# Patient Record
Sex: FEMALE | Race: BLACK OR AFRICAN AMERICAN | Employment: PART TIME | URBAN - METROPOLITAN AREA
[De-identification: names, ages, dates, MRNs, and addresses within clinical notes are randomized per-mention and may not be internally consistent; named-entity substitution may affect disease eponyms.]

---
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
## 2020-07-09 PROBLEM — Z34.81 MULTIGRAVIDA IN FIRST TRIMESTER: Status: ACTIVE | Noted: 2020-07-09

## 2020-07-09 PROBLEM — Z87.59 HISTORY OF GESTATIONAL HYPERTENSION: Status: ACTIVE | Noted: 2020-07-09

## 2020-07-09 PROBLEM — Z34.82 MULTIGRAVIDA IN SECOND TRIMESTER: Status: ACTIVE | Noted: 2020-07-09

## 2020-07-20 PROBLEM — A74.9 CHLAMYDIA INFECTION AFFECTING PREGNANCY IN SECOND TRIMESTER: Status: ACTIVE | Noted: 2020-07-20

## 2020-07-20 PROBLEM — O98.812 CHLAMYDIA INFECTION AFFECTING PREGNANCY IN SECOND TRIMESTER: Status: ACTIVE | Noted: 2020-07-20

## 2020-07-20 PROBLEM — O98.212 GONORRHEA AFFECTING PREGNANCY IN SECOND TRIMESTER: Status: ACTIVE | Noted: 2020-07-20

## 2020-10-06 PROBLEM — O99.119 THROMBOCYTOPENIA AFFECTING PREGNANCY (HCC): Status: ACTIVE | Noted: 2020-10-06

## 2020-10-06 PROBLEM — D69.6 THROMBOCYTOPENIA AFFECTING PREGNANCY (HCC): Status: ACTIVE | Noted: 2020-10-06

## 2020-10-19 PROBLEM — Z34.83 MULTIGRAVIDA IN THIRD TRIMESTER: Status: ACTIVE | Noted: 2020-07-09

## 2020-11-02 PROBLEM — O26.893 VAGINAL DISCHARGE DURING PREGNANCY IN THIRD TRIMESTER: Status: ACTIVE | Noted: 2020-11-02

## 2020-11-02 PROBLEM — N89.8 VAGINAL DISCHARGE DURING PREGNANCY IN THIRD TRIMESTER: Status: ACTIVE | Noted: 2020-11-02

## 2020-11-21 ENCOUNTER — HOSPITAL ENCOUNTER (OUTPATIENT)
Age: 20
Discharge: HOME | End: 2020-11-21
Attending: OBSTETRICS & GYNECOLOGY | Admitting: OBSTETRICS & GYNECOLOGY
Payer: COMMERCIAL

## 2020-11-21 VITALS
SYSTOLIC BLOOD PRESSURE: 131 MMHG | TEMPERATURE: 98 F | BODY MASS INDEX: 33.75 KG/M2 | HEART RATE: 101 BPM | HEIGHT: 66 IN | DIASTOLIC BLOOD PRESSURE: 74 MMHG | WEIGHT: 210 LBS | RESPIRATION RATE: 18 BRPM

## 2020-11-21 PROBLEM — O46.93 VAGINAL BLEEDING IN PREGNANCY, THIRD TRIMESTER: Status: ACTIVE | Noted: 2020-11-21

## 2020-11-21 LAB
GLUCOSE, GLUUPC: NEGATIVE
KETONES UR-MCNC: NEGATIVE MG/DL
PROT UR QL: NEGATIVE

## 2020-11-21 PROCEDURE — 75810000275 HC EMERGENCY DEPT VISIT NO LEVEL OF CARE

## 2020-11-21 PROCEDURE — 76815 OB US LIMITED FETUS(S): CPT

## 2020-11-21 PROCEDURE — 59025 FETAL NON-STRESS TEST: CPT

## 2020-11-21 PROCEDURE — 81002 URINALYSIS NONAUTO W/O SCOPE: CPT | Performed by: OBSTETRICS & GYNECOLOGY

## 2020-11-21 PROCEDURE — 99285 EMERGENCY DEPT VISIT HI MDM: CPT

## 2020-11-21 NOTE — PROGRESS NOTES
Patient presents to triage from home with complaints of vaginal bleeding after intercourse. Denies any contractions reports decreased fetal movement.

## 2020-11-21 NOTE — H&P
Chief Complaint: VB      21 y.o. female  at 34w3d  weeks gestation who is seen for an episode of VB after having intercourse earlier this evening. Also, pt notes decreased FM over the last 1-2 hours. Pt denies LOF, uterine ctx, abdominal pain, UTI, PEC, or COVID-19 symptoms. HISTORY:    Social History     Substance and Sexual Activity   Sexual Activity Yes     Patient's last menstrual period was 2020 (approximate).     Social History     Socioeconomic History    Marital status: SINGLE     Spouse name: Not on file    Number of children: Not on file    Years of education: Not on file    Highest education level: Not on file   Occupational History    Not on file   Social Needs    Financial resource strain: Not on file    Food insecurity     Worry: Not on file     Inability: Not on file    Transportation needs     Medical: Not on file     Non-medical: Not on file   Tobacco Use    Smoking status: Never Smoker    Smokeless tobacco: Never Used   Substance and Sexual Activity    Alcohol use: No    Drug use: No    Sexual activity: Yes   Lifestyle    Physical activity     Days per week: Not on file     Minutes per session: Not on file    Stress: Not on file   Relationships    Social connections     Talks on phone: Not on file     Gets together: Not on file     Attends Ca[employer] service: Not on file     Active member of club or organization: Not on file     Attends meetings of clubs or organizations: Not on file     Relationship status: Not on file    Intimate partner violence     Fear of current or ex partner: Not on file     Emotionally abused: Not on file     Physically abused: Not on file     Forced sexual activity: Not on file   Other Topics Concern     Service Not Asked    Blood Transfusions Not Asked    Caffeine Concern No    Occupational Exposure Not Asked   Mary [place]ym[place] Hazards Not Asked    Sleep Concern Not Asked    Stress Concern Not Asked    Weight Concern Not Asked    Special Diet Not Asked    Back Care Not Asked    Exercise No    Bike Helmet Not Asked    Seat Belt Yes    Self-Exams No   Social History Narrative    Abuse: Feels safe at home, no history of physical abuse, no history of sexual abuse       No past surgical history on file. Past Medical History:   Diagnosis Date    Allergic rhinitis 2016    Chlamydia     Gonorrhea 2020    Obesity 11/29/2017         ROS:  An 8 point review of symptoms negative except for chief complaint as described above. PHYSICAL EXAM:  Blood pressure 131/74, pulse (!) 101, temperature 98 °F (36.7 °C), resp. rate 18, height 5' 6\" (1.676 m), weight 95.3 kg (210 lb), last menstrual period 04/12/2020. Constitutional: The patient appears well, alert, oriented x 3. Cardiovascular: Heart RRR, no murmurs. Respiratory: Lungs clear, no respiratory distress  GI: Abdomen soft, nontender, no guarding  No fundal tenderness  Musculoskeletal: no cva tenderness  Lower ext: no edema, neg benjamin's, reflexes +2  Skin: no rashes or lesions  Psychiatric:Mood/ Affect: appropriate  Genitourinary: SVE: long and closed; no active bleeding in the vagina  FHT: Category 1 with mod variability  TOCO:no ctx  Abd ultrasound: vtx; KYRA - 11cm; placenta appears normal without abruption    I personally reviewed pt's medical record including relevant labs and ultrasounds  I reviewed the NST at today's encounter    Assessment/Plan:  Pt presents with an episode of VB after intercourse earlier this evening. The VB has resolved. Will monitor the FHR tracing until it is reactive. Once the NST is reactive, will discharge the pt to home with PTL and VB precautions. Pt will then f/u with her PObP.

## 2020-11-21 NOTE — DISCHARGE INSTRUCTIONS
Patient Education   Patient Education        Early Stage of Labor at Home: Care Instructions  Your Care Instructions     If you came to the hospital while in early labor, your doctor may have asked if you want to labor at home until your contractions are stronger. Many women stay at home during early labor. This is often the longest part of the birthing process. It may last up to 2 to 3 days. Contractions are mild to moderate and shorter (about 30 to 45 seconds). You can usually keep talking during them. Contractions may also be irregular, about 5 to 20 minutes apart. They may even stop for a while. It helps to stay as relaxed as you can during this time. You can spend some or all of your early labor at home or anywhere else you may be comfortable. If you live far from the hospital or birthing center, you may want to think about going somewhere nearby so you can get back to the hospital quickly. For some women, there may be benefits to staying home during early labor, such as avoiding medicines or procedures. As labor progresses, you'll shift from early labor to active labor. During this time, contractions get more intense. They occur more often, about every 2 to 3 minutes. They also last longer, about 50 to 70 seconds. You will feel them even when you change positions and walk or move around. It may be hard to tell if you are in active labor. If you aren't sure, call your doctor or midwife. As your labor progresses, check in with your doctor or midwife about when to come back to the hospital or birthing center. You may have special instructions if your water broke or you tested positive for group B strep. Follow-up care is a key part of your treatment and safety. Be sure to make and go to all appointments, and call your doctor if you are having problems. It's also a good idea to know your test results and keep a list of the medicines you take. How can you care for yourself at home? · Get support.  Having a support person with you from early labor until after childbirth can have a positive effect on childbirth. · Find distractions. During early labor, you can walk, play cards, watch TV, or listen to music to help take your mind off your contractions. · Ask your partner, labor , or  for a massage. Shoulder and low back massage during contractions may ease your pain. Strong massage of the back muscles (counterpressure) during contractions may help relieve the pain of back labor. Tell your labor  exactly where to push and how hard to push. · Use imagery. This means using your imagination to decrease your pain. For instance, to help manage pain, picture your contractions as waves rolling over you. Picture a peaceful place, such as a beach or mountain stream, to help you relax between contractions. · Change positions during labor. Walking, kneeling, or sitting on a big rubber ball (birth ball) are good options. · Use focused breathing techniques. Breathing in a rhythm can distract you from pain. · Take a warm shower or bath. Warm water may ease pain and stress. When should you call for help? Call 911 anytime you think you may need emergency care. For example, call if:    · You passed out (lost consciousness).     · You have a seizure.     · You have severe vaginal bleeding.     · You have severe pain in your belly or pelvis.     · You have had fluid gushing or leaking from your vagina and you know or think the umbilical cord is bulging into your vagina. If this happens, immediately get down on your knees so your rear end (buttocks) is higher than your head. This will decrease the pressure on the cord until help arrives. Call your doctor now or seek immediate medical care if:    · You have new or worse signs of preeclampsia, such as:  ? Sudden swelling of your face, hands, or feet. ? New vision problems (such as dimness, blurring, or seeing spots).   ? A severe headache.     · You have any vaginal bleeding.     · You have belly pain or cramping.     · You have a fever.     · You have had regular contractions (with or without pain) for an hour. This means that you have 8 or more within 1 hour or 4 or more in 20 minutes after you change your position and drink fluids.     · You have a sudden release of fluid from your vagina.     · You have low back pain or pelvic pressure that does not go away.     · You notice that your baby has stopped moving or is moving much less than normal.   Watch closely for changes in your health, and be sure to contact your doctor if you have any problems. Where can you learn more? Go to http://www.gray.com/  Enter [id number] in the search box to learn more about \"Early Stage of Labor at Home: Care Instructions. \"  Current as of: February 11, 2020               Content Version: 12.6  © 1834-8998 [employer]. Care instructions adapted under license by [employer] (which disclaims liability or warranty for this information). If you have questions about a medical condition or this instruction, always ask your healthcare professional. [address] any warranty or liability for your use of this information. Counting Your Baby's Kicks: Care Instructions  Your Care Instructions     Counting your baby's kicks is one way your doctor can tell that your baby is healthy. Most women--especially in a first pregnancy--feel their baby move for the first time between 16 and 22 weeks. The movement may feel like flutters rather than kicks. Your baby may move more at certain times of the day. When you are active, you may notice less kicking than when you are resting. At your prenatal visits, your doctor will ask whether the baby is active. In your last trimester, your doctor may ask you to count the number of times you feel your baby move. Follow-up care is a key part of your treatment and safety.  Be sure to make and go to all appointments, and call your doctor if you are having problems. It's also a good idea to know your test results and keep a list of the medicines you take. How do you count fetal kicks? · A common method of checking your baby's movement is to count the number of kicks or moves you feel in 1 hour. Ten movements (such as kicks, flutters, or rolls) in 1 hour are normal. Some doctors suggest that you count in the morning until you get to 10 movements. Then you can quit for that day and start again the next day. · Pick your baby's most active time of day to count. This may be any time from morning to evening. · If you do not feel 10 movements in an hour, your baby may be sleeping. Wait for the next hour and count again. When should you call for help? Call your doctor now or seek immediate medical care if:    · You noticed that your baby has stopped moving or is moving much less than normal.   Watch closely for changes in your health, and be sure to contact your doctor if you have any problems. Where can you learn more? Go to http://www.gray.com/  Enter [id number] in the search box to learn more about \"Counting Your Baby's Kicks: Care Instructions. \"  Current as of: February 11, 2020               Content Version: 12.6  © 2923-1654 [employer]. Care instructions adapted under license by [employer] (which disclaims liability or warranty for this information). If you have questions about a medical condition or this instruction, always ask your healthcare professional. [address] any warranty or liability for your use of this information.

## 2020-11-21 NOTE — PROGRESS NOTES
Patient discharged to home in stable condition. Instructions given on kick counts and early labor, patient and significant other verbalize understanding.

## 2020-11-21 NOTE — PROGRESS NOTES
Dr Mackenzie De Paz  in room for ultrasound and SVE  0/0 small amount of dark brown glove noted on glove.

## 2020-11-24 PROBLEM — O26.893 VAGINAL DISCHARGE DURING PREGNANCY IN THIRD TRIMESTER: Status: RESOLVED | Noted: 2020-11-02 | Resolved: 2020-11-24

## 2020-11-24 PROBLEM — N89.8 VAGINAL DISCHARGE DURING PREGNANCY IN THIRD TRIMESTER: Status: RESOLVED | Noted: 2020-11-02 | Resolved: 2020-11-24

## 2020-11-24 PROBLEM — O46.93 VAGINAL BLEEDING IN PREGNANCY, THIRD TRIMESTER: Status: RESOLVED | Noted: 2020-11-21 | Resolved: 2020-11-24

## 2020-12-11 PROBLEM — O99.820 GBS (GROUP B STREPTOCOCCUS CARRIER), +RV CULTURE, CURRENTLY PREGNANT: Status: ACTIVE | Noted: 2020-12-11

## 2020-12-15 PROBLEM — O98.813 CHLAMYDIA INFECTION AFFECTING PREGNANCY IN THIRD TRIMESTER: Status: ACTIVE | Noted: 2020-07-20

## 2020-12-15 PROBLEM — O98.213 GONORRHEA AFFECTING PREGNANCY IN THIRD TRIMESTER: Status: ACTIVE | Noted: 2020-07-20

## 2020-12-26 ENCOUNTER — HOSPITAL ENCOUNTER (INPATIENT)
Age: 20
LOS: 2 days | Discharge: HOME | DRG: 560 | End: 2020-12-28
Attending: OBSTETRICS & GYNECOLOGY | Admitting: OBSTETRICS & GYNECOLOGY
Payer: COMMERCIAL

## 2020-12-26 ENCOUNTER — ANESTHESIA (OUTPATIENT)
Dept: LABOR AND DELIVERY | Age: 20
DRG: 560 | End: 2020-12-26
Payer: COMMERCIAL

## 2020-12-26 ENCOUNTER — ANESTHESIA EVENT (OUTPATIENT)
Dept: LABOR AND DELIVERY | Age: 20
DRG: 560 | End: 2020-12-26
Payer: COMMERCIAL

## 2020-12-26 DIAGNOSIS — G89.18 POSTOPERATIVE PAIN: Primary | ICD-10-CM

## 2020-12-26 PROBLEM — Z34.90 ENCOUNTER FOR INDUCTION OF LABOR: Status: ACTIVE | Noted: 2020-12-26

## 2020-12-26 PROBLEM — Z3A.39 39 WEEKS GESTATION OF PREGNANCY: Status: ACTIVE | Noted: 2020-12-26

## 2020-12-26 PROBLEM — B95.1 GROUP BETA STREP POSITIVE: Status: ACTIVE | Noted: 2020-12-26

## 2020-12-26 LAB
ABO + RH BLD: NORMAL
ARTERIAL PATENCY WRIST A: ABNORMAL
ARTERIAL PATENCY WRIST A: ABNORMAL
BASE DEFICIT BLD-SCNC: 5 MMOL/L
BASE DEFICIT BLDV-SCNC: 4 MMOL/L
BDY SITE: ABNORMAL
BDY SITE: ABNORMAL
BLOOD GROUP ANTIBODIES SERPL: NORMAL
CO2 BLD-SCNC: 23 MMOL/L
CO2 BLD-SCNC: 25 MMOL/L
COLLECT TIME,HTIME: 1436
COLLECT TIME,HTIME: 1436
ERYTHROCYTE [DISTWIDTH] IN BLOOD BY AUTOMATED COUNT: 13.5 % (ref 11.9–14.6)
GAS FLOW.O2 O2 DELIVERY SYS: ABNORMAL L/MIN
GAS FLOW.O2 O2 DELIVERY SYS: ABNORMAL L/MIN
HCO3 BLD-SCNC: 23.4 MMOL/L (ref 22–26)
HCO3 BLDV-SCNC: 21.9 MMOL/L (ref 23–28)
HCT VFR BLD AUTO: 35.5 % (ref 35.8–46.3)
HGB BLD-MCNC: 11.3 G/DL (ref 11.7–15.4)
MCH RBC QN AUTO: 27.2 PG (ref 26.1–32.9)
MCHC RBC AUTO-ENTMCNC: 31.8 G/DL (ref 31.4–35)
MCV RBC AUTO: 85.5 FL (ref 79.6–97.8)
NRBC # BLD: 0 K/UL (ref 0–0.2)
PCO2 BLD: 54.2 MMHG (ref 35–45)
PCO2 BLDV: 41.8 MMHG (ref 41–51)
PH BLD: 7.24 [PH] (ref 7.35–7.45)
PH BLDV: 7.33 [PH] (ref 7.32–7.42)
PLATELET # BLD AUTO: 146 K/UL (ref 150–450)
PMV BLD AUTO: 13.3 FL (ref 9.4–12.3)
PO2 BLD: 15 MMHG (ref 75–100)
PO2 BLDV: 25 MMHG
RBC # BLD AUTO: 4.15 M/UL (ref 4.05–5.2)
SAO2 % BLD: 14 % (ref 95–98)
SAO2 % BLDV: 42 % (ref 65–88)
SERVICE CMNT-IMP: ABNORMAL
SERVICE CMNT-IMP: ABNORMAL
SPECIMEN EXP DATE BLD: NORMAL
SPECIMEN TYPE: ABNORMAL
SPECIMEN TYPE: ABNORMAL
WBC # BLD AUTO: 10.5 K/UL (ref 4.3–11.1)

## 2020-12-26 PROCEDURE — 2709999900 HC NON-CHARGEABLE SUPPLY

## 2020-12-26 PROCEDURE — 85027 COMPLETE CBC AUTOMATED: CPT

## 2020-12-26 PROCEDURE — 74011000250 HC RX REV CODE- 250: Performed by: ANESTHESIOLOGY

## 2020-12-26 PROCEDURE — 65270000029 HC RM PRIVATE

## 2020-12-26 PROCEDURE — 74011250636 HC RX REV CODE- 250/636: Performed by: OBSTETRICS & GYNECOLOGY

## 2020-12-26 PROCEDURE — 75410000003 HC RECOV DEL/VAG/CSECN EA 0.5 HR: Performed by: OBSTETRICS & GYNECOLOGY

## 2020-12-26 PROCEDURE — 74011250637 HC RX REV CODE- 250/637: Performed by: OBSTETRICS & GYNECOLOGY

## 2020-12-26 PROCEDURE — 3E033VJ INTRODUCTION OF OTHER HORMONE INTO PERIPHERAL VEIN, PERCUTANEOUS APPROACH: ICD-10-PCS | Performed by: OBSTETRICS & GYNECOLOGY

## 2020-12-26 PROCEDURE — 3E0R3BZ INTRODUCTION OF ANESTHETIC AGENT INTO SPINAL CANAL, PERCUTANEOUS APPROACH: ICD-10-PCS | Performed by: ANESTHESIOLOGY

## 2020-12-26 PROCEDURE — 0UQGXZZ REPAIR VAGINA, EXTERNAL APPROACH: ICD-10-PCS | Performed by: OBSTETRICS & GYNECOLOGY

## 2020-12-26 PROCEDURE — 74011250636 HC RX REV CODE- 250/636: Performed by: REGISTERED NURSE

## 2020-12-26 PROCEDURE — 77030014125 HC TY EPDRL BBMI -B: Performed by: ANESTHESIOLOGY

## 2020-12-26 PROCEDURE — 82803 BLOOD GASES ANY COMBINATION: CPT

## 2020-12-26 PROCEDURE — 76060000078 HC EPIDURAL ANESTHESIA: Performed by: OBSTETRICS & GYNECOLOGY

## 2020-12-26 PROCEDURE — 0UQMXZZ REPAIR VULVA, EXTERNAL APPROACH: ICD-10-PCS | Performed by: OBSTETRICS & GYNECOLOGY

## 2020-12-26 PROCEDURE — 75410000000 HC DELIVERY VAGINAL/SINGLE: Performed by: OBSTETRICS & GYNECOLOGY

## 2020-12-26 PROCEDURE — 00HU33Z INSERTION OF INFUSION DEVICE INTO SPINAL CANAL, PERCUTANEOUS APPROACH: ICD-10-PCS | Performed by: ANESTHESIOLOGY

## 2020-12-26 PROCEDURE — 75410000002 HC LABOR FEE PER 1 HR: Performed by: OBSTETRICS & GYNECOLOGY

## 2020-12-26 PROCEDURE — A4300 CATH IMPL VASC ACCESS PORTAL: HCPCS | Performed by: ANESTHESIOLOGY

## 2020-12-26 PROCEDURE — 77030019905 HC CATH URETH INTMIT MDII -A

## 2020-12-26 PROCEDURE — 74011000258 HC RX REV CODE- 258: Performed by: OBSTETRICS & GYNECOLOGY

## 2020-12-26 PROCEDURE — 86900 BLOOD TYPING SEROLOGIC ABO: CPT

## 2020-12-26 RX ORDER — HYDROCODONE BITARTRATE AND ACETAMINOPHEN 10; 325 MG/1; MG/1
1 TABLET ORAL
Status: DISCONTINUED | OUTPATIENT
Start: 2020-12-26 | End: 2020-12-28 | Stop reason: HOSPADM

## 2020-12-26 RX ORDER — OXYTOCIN/RINGER'S LACTATE 30/500 ML
87.3 PLASTIC BAG, INJECTION (ML) INTRAVENOUS AS NEEDED
Status: COMPLETED | OUTPATIENT
Start: 2020-12-26 | End: 2020-12-26

## 2020-12-26 RX ORDER — ONDANSETRON 4 MG/1
4 TABLET, ORALLY DISINTEGRATING ORAL
Status: DISCONTINUED | OUTPATIENT
Start: 2020-12-26 | End: 2020-12-28 | Stop reason: HOSPADM

## 2020-12-26 RX ORDER — OXYTOCIN/RINGER'S LACTATE 30/500 ML
10 PLASTIC BAG, INJECTION (ML) INTRAVENOUS AS NEEDED
Status: DISCONTINUED | OUTPATIENT
Start: 2020-12-26 | End: 2020-12-28 | Stop reason: ALTCHOICE

## 2020-12-26 RX ORDER — HYDROCODONE BITARTRATE AND ACETAMINOPHEN 7.5; 325 MG/1; MG/1
1 TABLET ORAL
Status: DISCONTINUED | OUTPATIENT
Start: 2020-12-26 | End: 2020-12-28 | Stop reason: HOSPADM

## 2020-12-26 RX ORDER — ROPIVACAINE HYDROCHLORIDE 2 MG/ML
INJECTION, SOLUTION EPIDURAL; INFILTRATION; PERINEURAL AS NEEDED
Status: DISCONTINUED | OUTPATIENT
Start: 2020-12-26 | End: 2020-12-26 | Stop reason: HOSPADM

## 2020-12-26 RX ORDER — IBUPROFEN 800 MG/1
800 TABLET ORAL
Status: DISCONTINUED | OUTPATIENT
Start: 2020-12-26 | End: 2020-12-28 | Stop reason: HOSPADM

## 2020-12-26 RX ORDER — ROPIVACAINE HYDROCHLORIDE 2 MG/ML
INJECTION, SOLUTION EPIDURAL; INFILTRATION; PERINEURAL
Status: DISCONTINUED | OUTPATIENT
Start: 2020-12-26 | End: 2020-12-26 | Stop reason: HOSPADM

## 2020-12-26 RX ORDER — OXYTOCIN/RINGER'S LACTATE 30/500 ML
0-20 PLASTIC BAG, INJECTION (ML) INTRAVENOUS
Status: DISCONTINUED | OUTPATIENT
Start: 2020-12-26 | End: 2020-12-26

## 2020-12-26 RX ORDER — SIMETHICONE 80 MG
80 TABLET,CHEWABLE ORAL
Status: DISCONTINUED | OUTPATIENT
Start: 2020-12-26 | End: 2020-12-28 | Stop reason: HOSPADM

## 2020-12-26 RX ORDER — LOPERAMIDE HYDROCHLORIDE 2 MG/1
2 CAPSULE ORAL AS NEEDED
Status: DISCONTINUED | OUTPATIENT
Start: 2020-12-26 | End: 2020-12-28 | Stop reason: HOSPADM

## 2020-12-26 RX ORDER — BUTORPHANOL TARTRATE 2 MG/ML
1 INJECTION INTRAMUSCULAR; INTRAVENOUS
Status: DISCONTINUED | OUTPATIENT
Start: 2020-12-26 | End: 2020-12-26 | Stop reason: HOSPADM

## 2020-12-26 RX ORDER — OXYTOCIN/RINGER'S LACTATE 30/500 ML
10 PLASTIC BAG, INJECTION (ML) INTRAVENOUS AS NEEDED
Status: COMPLETED | OUTPATIENT
Start: 2020-12-26 | End: 2020-12-26

## 2020-12-26 RX ORDER — SODIUM CHLORIDE 0.9 % (FLUSH) 0.9 %
5-40 SYRINGE (ML) INJECTION EVERY 8 HOURS
Status: DISCONTINUED | OUTPATIENT
Start: 2020-12-26 | End: 2020-12-26

## 2020-12-26 RX ORDER — LIDOCAINE HYDROCHLORIDE AND EPINEPHRINE 15; 5 MG/ML; UG/ML
INJECTION, SOLUTION EPIDURAL
Status: COMPLETED | OUTPATIENT
Start: 2020-12-26 | End: 2020-12-26

## 2020-12-26 RX ORDER — DOCUSATE SODIUM 100 MG/1
100 CAPSULE, LIQUID FILLED ORAL 2 TIMES DAILY
Status: DISCONTINUED | OUTPATIENT
Start: 2020-12-26 | End: 2020-12-28 | Stop reason: HOSPADM

## 2020-12-26 RX ORDER — MINERAL OIL
120 OIL (ML) ORAL
Status: DISCONTINUED | OUTPATIENT
Start: 2020-12-26 | End: 2020-12-26 | Stop reason: HOSPADM

## 2020-12-26 RX ORDER — ZOLPIDEM TARTRATE 5 MG/1
5 TABLET ORAL
Status: DISCONTINUED | OUTPATIENT
Start: 2020-12-26 | End: 2020-12-28 | Stop reason: HOSPADM

## 2020-12-26 RX ORDER — DEXTROSE, SODIUM CHLORIDE, SODIUM LACTATE, POTASSIUM CHLORIDE, AND CALCIUM CHLORIDE 5; .6; .31; .03; .02 G/100ML; G/100ML; G/100ML; G/100ML; G/100ML
125 INJECTION, SOLUTION INTRAVENOUS CONTINUOUS
Status: DISCONTINUED | OUTPATIENT
Start: 2020-12-26 | End: 2020-12-26

## 2020-12-26 RX ORDER — SODIUM CHLORIDE 0.9 % (FLUSH) 0.9 %
5-40 SYRINGE (ML) INJECTION AS NEEDED
Status: DISCONTINUED | OUTPATIENT
Start: 2020-12-26 | End: 2020-12-26

## 2020-12-26 RX ORDER — DIPHENHYDRAMINE HCL 25 MG
25 CAPSULE ORAL
Status: DISCONTINUED | OUTPATIENT
Start: 2020-12-26 | End: 2020-12-28 | Stop reason: HOSPADM

## 2020-12-26 RX ORDER — OXYTOCIN/RINGER'S LACTATE 30/500 ML
87.3 PLASTIC BAG, INJECTION (ML) INTRAVENOUS AS NEEDED
Status: DISCONTINUED | OUTPATIENT
Start: 2020-12-26 | End: 2020-12-28 | Stop reason: ALTCHOICE

## 2020-12-26 RX ORDER — LIDOCAINE HYDROCHLORIDE 10 MG/ML
1 INJECTION INFILTRATION; PERINEURAL
Status: DISCONTINUED | OUTPATIENT
Start: 2020-12-26 | End: 2020-12-26 | Stop reason: HOSPADM

## 2020-12-26 RX ORDER — LIDOCAINE HYDROCHLORIDE 20 MG/ML
JELLY TOPICAL
Status: DISCONTINUED | OUTPATIENT
Start: 2020-12-26 | End: 2020-12-26 | Stop reason: HOSPADM

## 2020-12-26 RX ADMIN — WITCH HAZEL 1 PAD: 500 SOLUTION RECTAL; TOPICAL at 18:11

## 2020-12-26 RX ADMIN — Medication 6 ML: at 10:22

## 2020-12-26 RX ADMIN — IBUPROFEN 800 MG: 800 TABLET ORAL at 23:05

## 2020-12-26 RX ADMIN — Medication 4 ML: at 10:26

## 2020-12-26 RX ADMIN — OXYTOCIN 6 MILLI-UNITS/MIN: 10 INJECTION INTRAVENOUS at 07:38

## 2020-12-26 RX ADMIN — SODIUM CHLORIDE 2.5 MILLION UNITS: 9 INJECTION, SOLUTION INTRAVENOUS at 11:10

## 2020-12-26 RX ADMIN — HYDROCODONE BITARTRATE AND ACETAMINOPHEN 1 TABLET: 10; 325 TABLET ORAL at 18:32

## 2020-12-26 RX ADMIN — OXYTOCIN 2 MILLI-UNITS/MIN: 10 INJECTION INTRAVENOUS at 06:29

## 2020-12-26 RX ADMIN — SODIUM CHLORIDE 5 MILLION UNITS: 900 INJECTION INTRAVENOUS at 05:53

## 2020-12-26 RX ADMIN — LIDOCAINE HYDROCHLORIDE,EPINEPHRINE BITARTRATE 3 ML: 15; .005 INJECTION, SOLUTION EPIDURAL; INFILTRATION; INTRACAUDAL; PERINEURAL at 10:07

## 2020-12-26 RX ADMIN — ROPIVACAINE HYDROCHLORIDE 10 ML/HR: 2 INJECTION, SOLUTION EPIDURAL; INFILTRATION at 10:26

## 2020-12-26 RX ADMIN — SODIUM CHLORIDE, SODIUM LACTATE, POTASSIUM CHLORIDE, CALCIUM CHLORIDE, AND DEXTROSE MONOHYDRATE 125 ML/HR: 600; 310; 30; 20; 5 INJECTION, SOLUTION INTRAVENOUS at 05:46

## 2020-12-26 RX ADMIN — HYDROCODONE BITARTRATE AND ACETAMINOPHEN 1 TABLET: 10; 325 TABLET ORAL at 23:05

## 2020-12-26 RX ADMIN — SODIUM CHLORIDE, SODIUM LACTATE, POTASSIUM CHLORIDE, AND CALCIUM CHLORIDE 1000 ML: 600; 310; 30; 20 INJECTION, SOLUTION INTRAVENOUS at 10:05

## 2020-12-26 RX ADMIN — OXYTOCIN 10000 MILLI-UNITS: 10 INJECTION INTRAVENOUS at 14:41

## 2020-12-26 RX ADMIN — SODIUM CHLORIDE 2.5 MILLION UNITS: 9 INJECTION, SOLUTION INTRAVENOUS at 14:18

## 2020-12-26 RX ADMIN — OXYTOCIN 87.3 MILLI-UNITS/MIN: 10 INJECTION INTRAVENOUS at 15:06

## 2020-12-26 RX ADMIN — IBUPROFEN 800 MG: 800 TABLET ORAL at 17:09

## 2020-12-26 NOTE — PROGRESS NOTES
Sriram Lawrence at bedside at [phone]. JADE Carreon at bedside at 1008    Assisted pt to sitting up on bedside at 0956. Timeout completed at 1 with MD, JADE and myself at bedside. Test dose given at 1021. Negative reaction. Dose given at 1023 . Pt assisted to lying back in left tilt position. EFM difficult to monitor. RN remained at bedside for duration of procedure    See anesthesia record for details. See vital sign flow sheet for BP. Tolerated procedure well.

## 2020-12-26 NOTE — ANESTHESIA PREPROCEDURE EVALUATION
Relevant Problems   No relevant active problems       Anesthetic History          Comments: No GA prior, epidural for labor x1     Review of Systems / Medical History  Patient summary reviewed and pertinent labs reviewed    Pulmonary  Within defined limits                 Neuro/Psych   Within defined limits           Cardiovascular                  Exercise tolerance: >4 METS     GI/Hepatic/Renal                Endo/Other             Other Findings   Comments:  Thrombocytopenia history           Physical Exam    Airway  Mallampati: I  TM Distance: 4 - 6 cm  Neck ROM: normal range of motion   Mouth opening: Normal     Cardiovascular    Rhythm: regular  Rate: normal         Dental  No notable dental hx       Pulmonary  Breath sounds clear to auscultation               Abdominal         Other Findings            Anesthetic Plan    ASA: 2  Anesthesia type: epidural            Anesthetic plan and risks discussed with: Patient and Spouse

## 2020-12-26 NOTE — PROGRESS NOTES
SBAR OUT Report: Mother    Verbal report given to Dia Tiwari RN (full name & credentials) on this patient, who is now being transferred to MIU (unit) for routine progression of care. The patient is not wearing a green \"Anesthesia-Duramorph\" band. Report consisted of patient's Situation, Background, Assessment and Recommendations (SBAR).  ID bands were compared with the identification form, and verified with the patient and receiving nurse. Information from the SBAR and the [employer] Report was reviewed with the receiving nurse; opportunity for questions and clarification provided.

## 2020-12-26 NOTE — ROUTINE PROCESS
SBAR IN Report: Mother Verbal report received from Shelly Banerjee RN (full name & credentials) on this patient, who is now being transferred from L & D (unit) for routine progression of care. The patient is not wearing a green \"Anesthesia-Duramorph\" band. Report consisted of patient's Situation, Background, Assessment and Recommendations (SBAR). [place] ID bands were compared with the identification form, and verified with the patient and transferring nurse. Information from the SBAR and the [place] Report was reviewed with the transferring nurse; opportunity for questions and clarification provided.

## 2020-12-26 NOTE — H&P
History & Physical    Name: Kwame Mcmillan MRN: [id number]  SSN: [id number]    YOB: 2000  Age: 21 y.o. Sex: female      Subjective:     Estimated Date of Delivery: 20  OB History    Para Term  AB Living   2 1 1     1   SAB TAB Ectopic Molar Multiple Live Births             1      # Outcome Date GA Lbr Randy/2nd Weight Sex Delivery Anes PTL Lv   2 Current            1 Term 19 37w1d / 00:41 7 lb 11.3 oz (3.495 kg) Donel Deter       Ms. Marcia Peñaloza is admitted with pregnancy at 39w3d for induction of labor due to favorable cervix at term. Prenatal course was normal.  Please see prenatal records for details. Past Medical History:   Diagnosis Date    Allergic rhinitis     Chlamydia     Gonorrhea     Obesity 2017     No past surgical history on file. Social History     Occupational History    Not on file   Tobacco Use    Smoking status: Never Smoker    Smokeless tobacco: Never Used   Substance and Sexual Activity    Alcohol use: No    Drug use: No    Sexual activity: Yes     Family History   Problem Relation Age of Onset    Obesity Mother     Allergic Rhinitis Father     Allergic Rhinitis Sister     Allergic Rhinitis Brother     Allergic Rhinitis Brother     Breast Cancer Neg Hx     Ovarian Cancer Neg Hx     Uterine Cancer Neg Hx     Colon Cancer Neg Hx        No Known Allergies  Prior to Admission medications    Medication Sig Start Date End Date Taking? Authorizing Provider   prenatal vit-iron fumarate-fa 27 mg iron- 0.8 mg tab tablet Take 1 Tab by mouth daily. 20  Yes Gaviota Kraus NP   terconazole (TERAZOL 7) 0.4 % vaginal cream Insert 1 Applicator into vagina nightly. 12/15/20   Gaviota Kraus NP        Review of Systems: A comprehensive review of systems was negative except for that written in the History of Present Illness.     Objective:     Vitals:  Vitals:    20 1025 20 1026 20 1028 20 1030   BP: 123/68 120/62 120/74 118/66   Pulse:   85 75   Resp:       Temp:            Physical Exam:  Patient without distress. Heart: Regular rate and rhythm  Lung: clear to auscultation throughout lung fields, no wheezes, no rales, no rhonchi and normal respiratory effort  Abdomen: soft, nontender  Cervical Exam: 3/60 %/-3/   Membranes:  Artificial Rupture of Membranes; Amniotic Fluid: small amount of clear fluid  Fetal Heart Rate: Reactive          Prenatal Labs:   Lab Results   Component Value Date/Time    ABO/Rh(D) A POSITIVE 12/26/2020 05:44 AM       Impression/Plan:     Principal Problem:    Encounter for induction of labor (12/26/2020)    Active Problems:    Multigravida in third trimester (7/9/2020)      Overview: EDC by 15 1/7 week US - unsure LMP            8/13/2020: Declines genetic screening            10/5/2020: Plans bottlefeeding, TDAP and Flu recommended            10/19/2020: EFW 52%, AC 20%, KYRA nl, vertex      GBS (group B Streptococcus carrier), +RV culture, currently pregnant (12/11/2020)      Overview: Treat in labor      39 weeks gestation of pregnancy (12/26/2020)      Group beta Strep positive (12/26/2020)         Plan: Admit for induction of labor. Group B Strep positive, will treat prophylactically with penicillin.

## 2020-12-26 NOTE — PROGRESS NOTES
0700: Bedside and Verbal shift change report given to JESSENIA Montalvo RN (oncoming nurse) by GILLIAN Anguiano RN (offgoing nurse). Report included the following information SBAR.       0702: Shift assessment complete- see flowsheets    0354: MD notified of SVE results and updated of EFM.  Will decrease pitocin per MD for late deceles

## 2020-12-26 NOTE — PROGRESS NOTES
Admission assessment complete as noted. Patient oriented to room and unit. Plan of care reviewed and patient verbalizes understanding. Questions encouraged and answered. Patent encouraged to call for needs or concerns. Safety Teaching reviewed:   1. Hand hygiene prior to handling the infant. 2. Use of bulb syringe. 3. Bracelets with matching numbers are placed on mother and infant  4. An infant security tag  [facility]) is placed on the infant's ankle and monitored  5. All OB nurses wear pink Employee badges - do not give your baby to anyone without proper identification. 6. Never leave the baby alone in the room. 7. The infant should be placed on their back to sleep. on a firm mattress. No toys should be placed in the crib. (safe sleep video offered to view)  8. Never shake the baby (video offered to view)  9. Infant fall prevention - do not sleep with the baby, and place the baby in the crib while ambulating. 8. Mother and Baby Care booklet given to Mother.

## 2020-12-26 NOTE — PROGRESS NOTES
Pt presented for scheduled induction. Pt states she had a phone interview with [address] [place] [address] RN and the information that was obtained has not changed. POC reviewed. IV started, Consents witnessed. Lab work drawn, sent to lab.

## 2020-12-26 NOTE — ANESTHESIA PROCEDURE NOTES
Epidural Block    Patient location during procedure: OB  Start time: 12/26/2020 10:07 AM  End time: 12/26/2020 10:20 AM  Reason for block: labor epidural  Staffing  Performed: attending   Anesthesiologist: Polo Gibbs MD  Preanesthetic Checklist  Completed: patient identified, IV checked, site marked, risks and benefits discussed, surgical consent, monitors and equipment checked, pre-op evaluation and timeout performed  Block Placement  Patient position: sitting  Prep: ChloraPrep  Sterility prep: cap, drape, mask, hand and gloves  Sedation level: no sedation  Patient monitoring: heart rate  Approach: right paramedian  Location: lumbar  Lumbar location: L1-L2 (attempted L2-3, placed L1-2)  Epidural  Loss of resistance technique: air  Guidance: landmark technique  Needle  Needle type: Tuohy   Needle gauge: 17 G  Needle length: 9 cm  Needle insertion depth: 5 cm  Catheter size: 19 G  Catheter at skin depth: 11 cm  Catheter securement method: clear occlusive dressing, liquid medical adhesive and stabilization device  Test dose: negative  Medications Administered  Lidocaine-EPINEPHrine (XYLOCAINE) 1.5 %-1:200,000 Epidural, 3 mL  Assessment  Number of attempts: 2  Procedure assessment: patient tolerated procedure well with no complications  Additional Notes  Timeout 1005, poor patient positioning despite multiple attempts to have her slouch made my first attempt impossible to get in between the vertebra, one interspace up went in with a single pass/no needle redirections

## 2020-12-27 PROCEDURE — 74011250637 HC RX REV CODE- 250/637: Performed by: OBSTETRICS & GYNECOLOGY

## 2020-12-27 PROCEDURE — 2709999900 HC NON-CHARGEABLE SUPPLY

## 2020-12-27 PROCEDURE — 65270000029 HC RM PRIVATE

## 2020-12-27 RX ADMIN — HYDROCODONE BITARTRATE AND ACETAMINOPHEN 1 TABLET: 10; 325 TABLET ORAL at 03:03

## 2020-12-27 RX ADMIN — DOCUSATE SODIUM 100 MG: 100 CAPSULE ORAL at 08:33

## 2020-12-27 RX ADMIN — HYDROCODONE BITARTRATE AND ACETAMINOPHEN 1 TABLET: 7.5; 325 TABLET ORAL at 14:16

## 2020-12-27 RX ADMIN — HYDROCODONE BITARTRATE AND ACETAMINOPHEN 1 TABLET: 10; 325 TABLET ORAL at 20:06

## 2020-12-27 RX ADMIN — DOCUSATE SODIUM 100 MG: 100 CAPSULE ORAL at 18:16

## 2020-12-27 RX ADMIN — IBUPROFEN 800 MG: 800 TABLET ORAL at 05:03

## 2020-12-27 RX ADMIN — IBUPROFEN 800 MG: 800 TABLET ORAL at 14:16

## 2020-12-27 RX ADMIN — HYDROCODONE BITARTRATE AND ACETAMINOPHEN 1 TABLET: 10; 325 TABLET ORAL at 06:44

## 2020-12-27 RX ADMIN — IBUPROFEN 800 MG: 800 TABLET ORAL at 20:06

## 2020-12-27 NOTE — PROGRESS NOTES
Progress Note                               Patient: Carlo Mathew MRN: [id number]  SSN: [id number]    YOB: 2000  Age: 21 y.o. Sex: female      Postpartum Day Number 1    Subjective:     Patient doing well postpartum without significant complaints. Voiding without difficulty. Patient reports normal lochia. . Breastfeeding: No     Objective:     Patient Vitals for the past 18 hrs:   Temp Pulse Resp BP SpO2   20 0708 97.9 °F (36.6 °C) 60 18 (!) 103/57 100 %   20 1925 98.2 °F (36.8 °C) 74 18 116/62 98 %   20 1800 99 °F (37.2 °C) 85 18 123/70 97 %        Temp (24hrs), Av.3 °F (36.8 °C), Min:97.9 °F (36.6 °C), Max:99 °F (37.2 °C)      Physical Exam:    Patient without distress. Lab/Data Review: All lab results for the last 24 hours reviewed. GBS: No results found for: GRBSEXT, GRBSEXT  Blood Type:   Lab Results   Component Value Date/Time    ABO/Rh(D) A POSITIVE 2020 05:44 AM        Assessment and Plan:     Patient appears to be having an uncomplicated postpartum course. Continue routine perineal care and maternal education.

## 2020-12-28 VITALS
SYSTOLIC BLOOD PRESSURE: 108 MMHG | HEART RATE: 75 BPM | RESPIRATION RATE: 17 BRPM | DIASTOLIC BLOOD PRESSURE: 51 MMHG | TEMPERATURE: 97.9 F | OXYGEN SATURATION: 99 %

## 2020-12-28 PROCEDURE — 2709999900 HC NON-CHARGEABLE SUPPLY

## 2020-12-28 PROCEDURE — 74011250637 HC RX REV CODE- 250/637: Performed by: OBSTETRICS & GYNECOLOGY

## 2020-12-28 RX ORDER — HYDROCODONE BITARTRATE AND ACETAMINOPHEN 7.5; 325 MG/1; MG/1
1 TABLET ORAL
Qty: 15 TAB | Refills: 0 | Status: SHIPPED | OUTPATIENT
Start: 2020-12-28 | End: 2021-01-04

## 2020-12-28 RX ORDER — IBUPROFEN 800 MG/1
800 TABLET ORAL
Qty: 100 TAB | Refills: 2 | Status: SHIPPED | OUTPATIENT
Start: 2020-12-28 | End: 2021-02-15

## 2020-12-28 RX ADMIN — HYDROCODONE BITARTRATE AND ACETAMINOPHEN 1 TABLET: 7.5; 325 TABLET ORAL at 10:38

## 2020-12-28 RX ADMIN — IBUPROFEN 800 MG: 800 TABLET ORAL at 10:38

## 2020-12-28 RX ADMIN — DOCUSATE SODIUM 100 MG: 100 CAPSULE ORAL at 10:38

## 2020-12-28 RX ADMIN — IBUPROFEN 800 MG: 800 TABLET ORAL at 03:41

## 2020-12-28 RX ADMIN — HYDROCODONE BITARTRATE AND ACETAMINOPHEN 1 TABLET: 10; 325 TABLET ORAL at 03:42

## 2020-12-28 NOTE — DISCHARGE INSTRUCTIONS

## 2020-12-28 NOTE — PROGRESS NOTES
Discharge instructions completed. Patient voiced understanding. Prescriptions sent electronically. Patient discharged home. Ambulating. Wheelchair refused.

## 2020-12-28 NOTE — PROGRESS NOTES
Progress Note                               Patient: Emelia Gloria MRN: [id number]  SSN: [id number]    YOB: 2000  Age: 21 y.o. Sex: female      Postpartum Day Number 2    Subjective:     Patient doing well postpartum without significant complaints. Voiding without difficulty. Patient reports normal lochia. . Breastfeeding: No     Objective:     Patient Vitals for the past 18 hrs:   Temp Pulse Resp BP SpO2   20 0724 97.9 °F (36.6 °C) 75 17 (!) 108/51 99 %   20 98.2 °F (36.8 °C) 65 20 122/75 100 %        Temp (24hrs), Av.1 °F (36.7 °C), Min:97.9 °F (36.6 °C), Max:98.2 °F (36.8 °C)      Physical Exam:    General:   Patient without distress. Abdomen: Soft, fundus firm at level of umbilicus, nontender   Lower Extremities: Negative for swelling, cords, or tenderness. Lab/Data Review:  CBC:    Recent Labs     20  0544   WBC 10.5   HGB 11.3*   HCT 35.5*   *     Blood Type:   Lab Results   Component Value Date/Time    ABO/Rh(D) A POSITIVE 2020 05:44 AM      Infant's Blood Type: Information for the patient's : April Lora [[id number]]     Lab Results   Component Value Date/Time    ABO/Rh(D) O POSITIVE 2020 02:36 PM          Assessment and Plan: Will discharge home today.     Raghavendra Melendez MD  1:15 PM

## 2020-12-28 NOTE — PROGRESS NOTES
Chart reviewed - 20 y/o, . SW met with patient while social distancing w/appropriate PPE. Patient denies any history of postpartum depression/anxiety. Patient lives w/FOB and states that she has family available for support/assistance. Patient given informational packet on  mood & anxiety disorders (resources/education). Family denies any additional needs from  at this time. Family has 's contact information should any needs/questions arise.     NICOL Rodriguez-CAROLINE  [facility]

## 2020-12-28 NOTE — DISCHARGE SUMMARY
Obstetrical Discharge Summary     Name: Charlotte Maher MRN: [id number]  SSN: [id number]    YOB: 2000  Age: 21 y.o. Sex: female      Allergies: Patient has no known allergies. Admit Date: 2020    Discharge Date: 2020     Admitting Physician: Claudio Dueñas MD     Attending Physician:  Hiro Davalos MD     * Admission Diagnoses: 39 weeks gestation of pregnancy [Z3A.39]; Encounter for induction of labor [Z34.90]; Group beta Strep positive [B95.1]    * Discharge Diagnoses:   Information for the patient's : Josef Romeo [[id number]]   Delivery of a 8 lb 3.2 oz (3.72 kg) female infant via Vaginal, Spontaneous on 2020 at 2:36 PM  by Claudio Dueñas. Apgars were 9  and 9 .        Additional Diagnoses:   Hospital Problems as of 2020 Date Reviewed: 2020          Codes Class Noted - Resolved POA    39 weeks gestation of pregnancy ICD-10-CM: Z3A.39  ICD-9-CM: V22.2  2020 - Present Unknown        Group beta Strep positive ICD-10-CM: B95.1  ICD-9-CM: 041.02  2020 - Present Unknown        * (Principal) Encounter for induction of labor ICD-10-CM: Z34.90  ICD-9-CM: V22.1  2020 - Present Yes        GBS (group B Streptococcus carrier), +RV culture, currently pregnant ICD-10-CM: O99.820  ICD-9-CM: V23.89, V02.51  2020 - Present Yes    Overview Signed 2020  8:30 AM by Hiro Davalos MD     Treat in labor             Multigravida in third trimester ICD-10-CM: Z34.83  ICD-9-CM: V22.1  2020 - Present Yes    Overview Addendum 10/19/2020  8:30 AM by Blaze España NP     EDC by 15 1/7 week US - unsure LMP    2020: Declines genetic screening    10/5/2020: Plans bottlefeeding, TDAP and Flu recommended    10/19/2020: EFW 52%, AC 20%, KYRA nl, vertex                  Lab Results   Component Value Date/Time    ABO/Rh(D) A POSITIVE 2020 05:44 AM      Immunization History   Administered Date(s) Administered    Tdap 2018       * Procedures:  with bilateral periurethral and vaginal tears       * Discharge Condition: good    [facility] Course: Normal hospital course following the delivery. * Disposition: Home    Discharge Medications:   Current Discharge Medication List      START taking these medications    Details   HYDROcodone-acetaminophen (NORCO) 7.5-325 mg per tablet Take 1 Tab by mouth every six (6) hours as needed for Pain for up to 7 days. Max Daily Amount: 4 Tabs. Qty: 15 Tab, Refills: 0    Associated Diagnoses: Postoperative pain      ibuprofen (MOTRIN) 800 mg tablet Take 1 Tab by mouth every six (6) hours as needed for Pain. Qty: 100 Tab, Refills: 2         CONTINUE these medications which have NOT CHANGED    Details   prenatal vit-iron fumarate-fa 27 mg iron- 0.8 mg tab tablet Take 1 Tab by mouth daily. Qty: 90 Tab, Refills: 3         STOP taking these medications       terconazole (TERAZOL 7) 0.4 % vaginal cream Comments:   Reason for Stopping:               * Follow-up Care/Patient Instructions:   Activity: No sex for 6 weeks and No driving while on analgesics  Diet: Regular Diet  Wound Care: As directed    Follow-up Information     Follow up With Specialties Details Why Contact Wilfredo Newell MD Obstetrics & Gynecology Schedule an appointment as soon as possible for a visit in 6 weeks  [address]  [phone]      None    None ([phone]) Patient stated that they have no PCP             Liana Ledesma MD  1:17 PM

## 2020-12-30 NOTE — L&D DELIVERY NOTE
Delivery Summary    Patient: Cali Myers MRN: [id number]  SSN: [id number]    YOB: 2000  Age: 21 y.o. Sex: female       Information for the patient's :  Baltazar Muniz [[id number]]       Labor Events:    Labor: No    Steroids: None   Cervical Ripening Date/Time:       Cervical Ripening Type: None   Antibiotics During Labor: Yes   Rupture Identifier:      Rupture Date/Time: 2020 11:05 AM   Rupture Type: AROM   Amniotic Fluid Volume: Scant    Amniotic Fluid Description: Clear    Amniotic Fluid Odor: None    Induction: Oxytocin       Induction Date/Time: 2020 6:29 AM    Indications for Induction: Elective    Augmentation: None   Augmentation Date/Time:      Indications for Augmentation:     Labor complications: None       Additional complications:        Delivery Events:  Indications For Episiotomy:     Episiotomy: None   Perineal Laceration(s):     Repaired:     Periurethral Laceration Location: bilateral    Repaired: Yes   Labial Laceration Location:     Repaired:     Sulcal Laceration Location:     Repaired:     Vaginal Laceration Location: right   Repaired: Yes   Cervical Laceration Location:     Repaired:     Repair Suture: Chromic 3-0   Number of Repair Packets: 1   Estimated Blood Loss (ml):  ml   Quantitative Blood Loss (ml)                Delivery Date: 2020    Delivery Time: 2:36 PM  Delivery Type: Vaginal, Spontaneous  Sex:  Female    Gestational Age: 38w3d   Delivery Clinician:  Eligio Smith  Living Status: Living   Delivery Location: L&D L&D 430          APGARS  One minute Five minutes Ten minutes   Skin color: 1   1        Heart rate: 2   2        Grimace: 2   2        Muscle tone: 2   2        Breathin   2        Totals: 9   9            Presentation: Vertex    Position: Left Occiput Anterior  Resuscitation Method:  Suctioning-bulb; Tactile Stimulation     Meconium Stained: None      Cord Information: 3 Vessels  Complications: None  Cord around:    Delayed cord clamping? Yes  Cord clamped date/time:2020  2:37 PM  Disposition of Cord Blood: Lab    Blood Gases Sent?: Yes    Placenta:  Date/Time: 2020  2:39 PM  Removal: Expressed      Appearance: Normal;Intact      Measurements:  Birth Weight: 8 lb 3.2 oz (3.72 kg)      Birth Length: 54 cm      Head Circumference: 35.5 cm      Chest Circumference: 33 cm     Abdominal Girth: Other Providers:   Arnel JAIME;CARA BULLOCK;VIET KAUR;KALI AGUILAR;SANDRINE GONZALEZ, Obstetrician;Primary Nurse;Primary  Nurse;Charge Nurse;Scrub Tech           Group B Strep: No results found for: Caleb Jj  Information for the patient's :  Ariel Chaves [[id number]]     Lab Results   Component Value Date/Time    ABO/Rh(D) O POSITIVE 2020 02:36 PM    KERI IgG NEG 2020 02:36 PM      No results for input(s): PCO2CB, PO2CB, HCO3I, SO2I, IBD, PTEMPI, SPECTI, PHICB, ISITE, IDEV, IALLEN in the last 72 hours.

## 2022-03-18 PROBLEM — O98.813 CHLAMYDIA INFECTION AFFECTING PREGNANCY IN THIRD TRIMESTER: Status: ACTIVE | Noted: 2020-07-20

## 2022-03-18 PROBLEM — O98.213 GONORRHEA AFFECTING PREGNANCY IN THIRD TRIMESTER: Status: ACTIVE | Noted: 2020-07-20

## 2022-03-18 PROBLEM — A74.9 CHLAMYDIA INFECTION AFFECTING PREGNANCY IN THIRD TRIMESTER: Status: ACTIVE | Noted: 2020-07-20

## 2022-04-18 PROBLEM — O98.813 CHLAMYDIA INFECTION AFFECTING PREGNANCY IN THIRD TRIMESTER: Status: RESOLVED | Noted: 2020-07-20 | Resolved: 2022-04-18

## 2022-04-18 PROBLEM — Z01.419 ENCOUNTER FOR ANNUAL ROUTINE GYNECOLOGICAL EXAMINATION: Status: ACTIVE | Noted: 2022-04-18

## 2022-04-18 PROBLEM — O98.213 GONORRHEA AFFECTING PREGNANCY IN THIRD TRIMESTER: Status: RESOLVED | Noted: 2020-07-20 | Resolved: 2022-04-18

## 2022-04-18 PROBLEM — A74.9 CHLAMYDIA INFECTION AFFECTING PREGNANCY IN THIRD TRIMESTER: Status: RESOLVED | Noted: 2020-07-20 | Resolved: 2022-04-18

## 2022-04-25 PROBLEM — R87.612 LGSIL ON PAP SMEAR OF CERVIX: Status: ACTIVE | Noted: 2022-04-25

## 2022-05-18 PROBLEM — Z01.419 ENCOUNTER FOR ANNUAL ROUTINE GYNECOLOGICAL EXAMINATION: Status: RESOLVED | Noted: 2022-04-18 | Resolved: 2022-05-18

## 2022-10-18 NOTE — PROGRESS NOTES
CC:   Chief Complaint   Patient presents with   • Congestion     Onset 2-3 weeks has Nose and chest congestion, cough, loss of appetite, neg covid test today at home        SUBJECTIVE:    Rico Arellano is a 15 year old female who presents to Immediate Care with respiratory symptoms which have been present for one week.   Symptoms include: sinus pressure, drainage, cough and sore throat      Denies: chest pain or shortness of breath.     The remainder of the ROS is negative.    OBJECTIVE:    Vitals:    10/18/22 1758   BP: 118/78   Pulse: 99   Resp: 16   Temp: 98 °F (36.7 °C)   SpO2: 96%   Weight: 59 kg (130 lb)   LMP: 04/15/2022      Gen: Alert, well hydrated, in no apparent distress  Ears: TM's intact without erythema, bulging, retraction, or fluid-level. External auditory canal clear  Conjunctiva: clear without injection or discharge  Nose: paranasal tenderness, nasal mucosa moist, pink, without rhinorrhea or sinus tenderness, nasal mucosa erythematous and swollen and purulent rhinorrhea  Neck: supple without cervical adenopathy. No meningismus  Throat: pink and moist without erythema, edema, or exudates and erythema moderate  Heart: regular rate and rhythm and no murmurs, clicks or gallops  Lungs: clear to auscultation without wheezes, rhonchi or rales; normal respiratory effort       Medical Decision Making      Patient was informed of possible serious medical complications of a sinus infection - Patients medical history and risk factor stratification was discussed.    Tests performed and interpreted - negative home COVID TEST TODAY     Management involves Reassurance and observation, hydration, symptomatic treatment - antibiotics were recommended today    ASSESSMENT/PLAN:    Sinusitis    Oral antibiotics, decongestants and hydration    Diagnosis and prognosis, treatment and side-effects were explained and all questions were answered satisfactorily and there were no further questions upon  Patient up to bathroom with RN assistance. Luz Elena-care taught and completed. Questions encouraged and answered. Patient ambulating without difficulty, encouraged to call for needs or concerns. Verbalizes understanding. discharge.    Electronically signed by: Germania Beasley MD  10/18/2022

## 2023-10-28 ENCOUNTER — HOSPITAL ENCOUNTER (EMERGENCY)
Age: 23
Discharge: HOME | End: 2023-10-28
Attending: EMERGENCY MEDICINE
Payer: COMMERCIAL

## 2023-10-28 VITALS
DIASTOLIC BLOOD PRESSURE: 92 MMHG | TEMPERATURE: 98 F | SYSTOLIC BLOOD PRESSURE: 146 MMHG | RESPIRATION RATE: 18 BRPM | HEART RATE: 68 BPM | OXYGEN SATURATION: 100 %

## 2023-10-28 DIAGNOSIS — N39.0 URINARY TRACT INFECTION WITHOUT HEMATURIA, SITE UNSPECIFIED: ICD-10-CM

## 2023-10-28 DIAGNOSIS — F41.1 ANXIETY STATE: Primary | ICD-10-CM

## 2023-10-28 LAB
ALBUMIN SERPL-MCNC: 3.8 G/DL (ref 3.5–5)
ALBUMIN/GLOB SERPL: 1 (ref 0.4–1.6)
ALP SERPL-CCNC: 85 U/L (ref 50–136)
ALT SERPL-CCNC: 37 U/L (ref 12–65)
ANION GAP SERPL CALC-SCNC: 5 MMOL/L (ref 2–11)
APPEARANCE UR: ABNORMAL
AST SERPL-CCNC: 27 U/L (ref 15–37)
BACTERIA URNS QL MICRO: ABNORMAL /HPF
BASOPHILS # BLD: 0.1 K/UL (ref 0–0.2)
BASOPHILS NFR BLD: 1 % (ref 0–2)
BILIRUB SERPL-MCNC: 0.9 MG/DL (ref 0.2–1.1)
BILIRUB UR QL: NEGATIVE
BUN SERPL-MCNC: 10 MG/DL (ref 6–23)
CALCIUM SERPL-MCNC: 9.2 MG/DL (ref 8.3–10.4)
CHLORIDE SERPL-SCNC: 112 MMOL/L (ref 101–110)
CO2 SERPL-SCNC: 26 MMOL/L (ref 21–32)
COLOR UR: ABNORMAL
CREAT SERPL-MCNC: 0.8 MG/DL (ref 0.6–1)
DIFFERENTIAL METHOD BLD: ABNORMAL
EOSINOPHIL # BLD: 0.1 K/UL (ref 0–0.8)
EOSINOPHIL NFR BLD: 2 % (ref 0.5–7.8)
EPI CELLS #/AREA URNS HPF: ABNORMAL /HPF
ERYTHROCYTE [DISTWIDTH] IN BLOOD BY AUTOMATED COUNT: 14.7 % (ref 11.9–14.6)
GLOBULIN SER CALC-MCNC: 4 G/DL (ref 2.8–4.5)
GLUCOSE SERPL-MCNC: 90 MG/DL (ref 65–100)
GLUCOSE UR STRIP.AUTO-MCNC: NEGATIVE MG/DL
HCT VFR BLD AUTO: 43 % (ref 35.8–46.3)
HGB BLD-MCNC: 13.4 G/DL (ref 11.7–15.4)
HGB UR QL STRIP: NEGATIVE
IMM GRANULOCYTES # BLD AUTO: 0 K/UL (ref 0–0.5)
IMM GRANULOCYTES NFR BLD AUTO: 0 % (ref 0–5)
KETONES UR QL STRIP.AUTO: ABNORMAL MG/DL
LEUKOCYTE ESTERASE UR QL STRIP.AUTO: ABNORMAL
LYMPHOCYTES # BLD: 2.1 K/UL (ref 0.5–4.6)
LYMPHOCYTES NFR BLD: 30 % (ref 13–44)
MCH RBC QN AUTO: 28.4 PG (ref 26.1–32.9)
MCHC RBC AUTO-ENTMCNC: 31.2 G/DL (ref 31.4–35)
MCV RBC AUTO: 91.1 FL (ref 82–102)
MONOCYTES # BLD: 0.5 K/UL (ref 0.1–1.3)
MONOCYTES NFR BLD: 7 % (ref 4–12)
MUCOUS THREADS URNS QL MICRO: ABNORMAL /LPF
NEUTS SEG # BLD: 4.2 K/UL (ref 1.7–8.2)
NEUTS SEG NFR BLD: 60 % (ref 43–78)
NITRITE UR QL STRIP.AUTO: NEGATIVE
NRBC # BLD: 0 K/UL (ref 0–0.2)
OTHER OBSERVATIONS: ABNORMAL
PH UR STRIP: 6 (ref 5–9)
PLATELET # BLD AUTO: 176 K/UL (ref 150–450)
PMV BLD AUTO: 12.7 FL (ref 9.4–12.3)
POTASSIUM SERPL-SCNC: 3.7 MMOL/L (ref 3.5–5.1)
PROT SERPL-MCNC: 7.8 G/DL (ref 6.3–8.2)
PROT UR STRIP-MCNC: ABNORMAL MG/DL
RBC # BLD AUTO: 4.72 M/UL (ref 4.05–5.2)
SODIUM SERPL-SCNC: 143 MMOL/L (ref 133–143)
SP GR UR REFRACTOMETRY: 1.03 (ref 1–1.02)
UROBILINOGEN UR QL STRIP.AUTO: 1 EU/DL (ref 0.2–1)
WBC # BLD AUTO: 7 K/UL (ref 4.3–11.1)
WBC URNS QL MICRO: ABNORMAL /HPF

## 2023-10-28 PROCEDURE — 80053 COMPREHEN METABOLIC PANEL: CPT

## 2023-10-28 PROCEDURE — 6370000000 HC RX 637 (ALT 250 FOR IP): Performed by: EMERGENCY MEDICINE

## 2023-10-28 PROCEDURE — 81001 URINALYSIS AUTO W/SCOPE: CPT

## 2023-10-28 PROCEDURE — 99283 EMERGENCY DEPT VISIT LOW MDM: CPT

## 2023-10-28 PROCEDURE — 85025 COMPLETE CBC W/AUTO DIFF WBC: CPT

## 2023-10-28 RX ORDER — CEPHALEXIN 500 MG/1
500 CAPSULE ORAL 3 TIMES DAILY
Qty: 15 CAPSULE | Refills: 0 | Status: SHIPPED | OUTPATIENT
Start: 2023-10-28 | End: 2023-11-02

## 2023-10-28 RX ORDER — HYDROXYZINE HYDROCHLORIDE 25 MG/1
25 TABLET, FILM COATED ORAL EVERY 8 HOURS PRN
Qty: 15 TABLET | Refills: 0 | Status: SHIPPED | OUTPATIENT
Start: 2023-10-28 | End: 2023-11-02

## 2023-10-28 RX ORDER — IBUPROFEN 400 MG/1
400 TABLET ORAL
Status: COMPLETED | OUTPATIENT
Start: 2023-10-28 | End: 2023-10-28

## 2023-10-28 RX ADMIN — IBUPROFEN 400 MG: 400 TABLET, FILM COATED ORAL at 17:55

## 2023-10-28 ASSESSMENT — PAIN SCALES - GENERAL: PAINLEVEL_OUTOF10: 5

## 2023-10-28 ASSESSMENT — PAIN DESCRIPTION - DESCRIPTORS: DESCRIPTORS: ACHING

## 2023-10-28 ASSESSMENT — LIFESTYLE VARIABLES
HOW OFTEN DO YOU HAVE A DRINK CONTAINING ALCOHOL: NEVER
HOW MANY STANDARD DRINKS CONTAINING ALCOHOL DO YOU HAVE ON A TYPICAL DAY: PATIENT DOES NOT DRINK

## 2023-10-28 ASSESSMENT — PAIN DESCRIPTION - LOCATION: LOCATION: HEAD

## 2023-10-28 NOTE — ED PROVIDER NOTES
Emergency Department Provider Note       PCP: None, None   Age: 21 y.o. Sex: female     DISPOSITION       No diagnosis found. Medical Decision Making     Complexity of Problems Addressed:  1 or more acute illnesses that pose a threat to life or bodily function. Data Reviewed and Analyzed:  I independently ordered and reviewed each unique test.  I reviewed external records: provider visit note from outside specialist.           Discussion of management or test interpretation. 24-year-old female delivered her third child approximately 2 months ago. This was a term vaginal delivery. No complications. Since then she is felt \"weird\". She cannot describe exactly what she is feeling. She is very tearful. She is not sure if she is suffering from anxiety or postpartum depression. She has not followed up with her OB/GYN yet. No suicidal or homicidal ideation. No hallucinations. No physical complaints. Denies vomiting, fever, urinary symptoms. Does report she has some help at home from her mother however her mother works and cannot be there all day long. Is feeling a bit stressed with the addition of the third child. Physical exam  Well-nourished -American female awake alert tearful but nontoxic. HEENT exam normocephalic atraumatic. Pupils equal and reactive. Oropharynx moist mucous membranes. Cardiovascular regular rate and rhythm. Lungs are clear. Abdomen is nontender. Extremities no edema. Skin is warm and dry. ED course  Lab work is unremarkable. Patient's symptoms are most likely postpartum depression. Urinalysis shows possible UTI although there are numerous epithelials. This is unlikely causing her symptoms. Will treat with Keflex. Patient would rather try anxiety medicine rather than antidepressants. She will follow-up with her OB/GYN this week for possible postpartum depression.   Again she is not suicidal or homicidal.  She feels safe going home and does have good

## 2023-10-28 NOTE — ED TRIAGE NOTES
Pt arrives via EMS from home c/o near syncopal episode. Postpartum 2 months. Broke up with baby daddy day after baby was born. No psych hx. Bgl 87. HR 60s. 12 lead normal sinus. 150/90. 100% RA.